# Patient Record
(demographics unavailable — no encounter records)

---

## 2024-11-22 NOTE — HISTORY OF PRESENT ILLNESS
[de-identified] : Patient is a 60-year-old female here for evaluation of right hip.  Patient states that she has been having ongoing right hip pain for about 3 weeks that has been worsening.  Patient states that she takes care of her daughter at home, does heavy lifting, pushing, pulling but did not have any injury.  Patient states that sometimes her right hip ayaka and feels unstable.  Denies numbness/tingling.  Patient states that she is in 9 out of 10 pain  Right hip exam: No effusion no ecchymosis no erythema no tense palpation good range of motion with mild discomfort to groin/lateral hip, no gross instability negative impingement neurovascular intact  X-ray right hip 2 views: No acute fractures, subluxations, dislocations mild degenerative changes  Discussed with patient detail clinical symptoms are consistent with right hip strain/internal derangement.  Advised rest, associate range of motion excise physical therapy.  Patient is unable to take anti-inflammatory medication due to history of ulcer.  Plan is for Medrol Dosepak.  Patient will follow-up in 6 weeks for reevaluation.  Will call earlier if symptoms worsen.  Possible MRI if symptoms worsen/continue

## 2025-01-03 NOTE — HISTORY OF PRESENT ILLNESS
[de-identified] : Patient is a 60-year-old female here for reevaluation of right hip.  Patient states since her last visit she has had about 75% improvement.  Denies numbness tingling, denies instability.  Pain comes and goes  Right hip exam: No effusion no ecchymosis no erythema no tense palpation good range of motion with minimal discomfort, no gross instability neurovascular intact  Reviewed prior x-ray right hip 2 views: No acute fractures, subluxations, dislocations mild degenerative changes  Discussed with patient detail that overall she is improving well, we will continue conservative treatment.  Patient will follow-up in 3 to 4 months for reevaluation continue home exercises.  Call if symptoms worsen or change.  Patient understands agrees with plan

## 2025-02-25 NOTE — HISTORY OF PRESENT ILLNESS
[de-identified] : Patient is a 61-year-old female here for evaluation of right hip.  Patient was last seen January 3, 2025 and her right hip pain was improved about 75%.  Patient states over the past week to 2 weeks her pain has been worsening and to this point has been unbearable, pain worsens with activities denies numbness/tingling, denies instability  Right hip exam: No effusion no ecchymosis no erythema no tense palpation guarding with range of motion with pain to groin and lateral hip no gross instability neurovascularly intact positive impingement  X-ray right hip and pelvis 2 views in office today: No acute fractures, subluxations, dislocations.  Compared to prior x-rays  Discussed with patient in detail that symptoms are consistent internal derangement/labral tear.  At this point patient has failed conservative treatments including home AAOS hip conditioning exercises for over 6 weeks, prescription anti-inflammatory medications.  MRI ordered for further evaluation.  Patient will call office after MRI to discuss results in detail, will follow-up with sports department after MRI, advised continuation of activity modification and rest.

## 2025-03-25 NOTE — HISTORY OF PRESENT ILLNESS
[de-identified] : right hip pain denies trauma  NAD Right hip:  no skin breakdown from hip without pain no ttp hip or GT ttp right lower abd NVI comp soft and nt  Xray right hip: jt space maintained  mri right hip IMPRESSION: Anterior superior acetabular labral tear. Mild osteoarthritis of the right hip with punctate osteophytes at the anterior superior acetabulum and partial thickness cartilaginous defect at the superior acetabulum. Right hamstring origin partial tear. 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  plan went over findings explained the imaging she is asymptomatic over her hip and GT diclofenac sent  she will be referred general surgery to assess possible hernia

## 2025-04-15 NOTE — HISTORY OF PRESENT ILLNESS
[de-identified] : Patient is a 61-year-old female returning for evaluation of right hip.  Patient states pain still comes and goes worsens randomly.  Patient states that she is going to follow-up with a general surgeon to rule out hernia.  Right hip exam: No effusion no ecchymosis no erythema no tenderness to palpation, good range of motion with mild discomfort to lateral insertional of gluteus, good strength no gross instability neurovascular intact  Reviewed prior imaging: X-ray right hip and pelvis 2 views in office today: No acute fractures, subluxations, dislocations.  Compared to prior x-rays  MRI right hip: Anterior superior acetabular tear, mild osteoarthritis, right hamstring origin partial tear, gluteal minimus and gluteal medius insertional tendinosis with small greater trochanteric bursitis  Discussed with patient in detail that symptoms are consistent internal derangement/labral tear/greater trochanteric bursitis.  Patient has not done well with anti-inflammatory medications in the past states that she has done well with muscle relaxers, cyclobenzaprine 10 mg once a day to take before bed sent to patient's pharmacy discussed potential risks and side effects of medication including drowsiness.  Home AAOS hip conditioning exercises given.  Patient will follow-up in 6 to 8 weeks if symptoms continue or have not improved.